# Patient Record
Sex: FEMALE | Race: ASIAN | NOT HISPANIC OR LATINO
[De-identification: names, ages, dates, MRNs, and addresses within clinical notes are randomized per-mention and may not be internally consistent; named-entity substitution may affect disease eponyms.]

---

## 2022-09-07 DIAGNOSIS — B35.1 TINEA UNGUIUM: ICD-10-CM

## 2022-09-07 DIAGNOSIS — Z82.49 FAMILY HISTORY OF ISCHEMIC HEART DISEASE AND OTHER DISEASES OF THE CIRCULATORY SYSTEM: ICD-10-CM

## 2022-09-07 DIAGNOSIS — M79.674 PAIN IN RIGHT TOE(S): ICD-10-CM

## 2022-09-07 DIAGNOSIS — I10 ESSENTIAL (PRIMARY) HYPERTENSION: ICD-10-CM

## 2022-09-07 DIAGNOSIS — M19.171 POST-TRAUMATIC OSTEOARTHRITIS, RIGHT ANKLE AND FOOT: ICD-10-CM

## 2022-09-07 DIAGNOSIS — M19.90 UNSPECIFIED OSTEOARTHRITIS, UNSPECIFIED SITE: ICD-10-CM

## 2022-09-07 DIAGNOSIS — Z88.9 ALLERGY STATUS TO UNSPECIFIED DRUGS, MEDICAMENTS AND BIOLOGICAL SUBSTANCES: ICD-10-CM

## 2022-09-07 DIAGNOSIS — Z87.448 PERSONAL HISTORY OF OTHER DISEASES OF URINARY SYSTEM: ICD-10-CM

## 2022-09-07 DIAGNOSIS — N28.9 DISORDER OF KIDNEY AND URETER, UNSPECIFIED: ICD-10-CM

## 2022-09-07 DIAGNOSIS — M79.675 PAIN IN LEFT TOE(S): ICD-10-CM

## 2022-09-07 DIAGNOSIS — E11.9 TYPE 2 DIABETES MELLITUS W/OUT COMPLICATIONS: ICD-10-CM

## 2022-09-07 PROBLEM — Z00.00 ENCOUNTER FOR PREVENTIVE HEALTH EXAMINATION: Status: ACTIVE | Noted: 2022-09-07

## 2022-09-07 RX ORDER — METOPROLOL SUCCINATE 100 MG/1
100 TABLET, EXTENDED RELEASE ORAL
Refills: 0 | Status: ACTIVE | COMMUNITY

## 2022-09-07 RX ORDER — GABAPENTIN 100 MG/1
100 CAPSULE ORAL
Refills: 0 | Status: ACTIVE | COMMUNITY

## 2022-09-07 RX ORDER — APIXABAN 2.5 MG/1
2.5 TABLET, FILM COATED ORAL
Refills: 0 | Status: ACTIVE | COMMUNITY

## 2022-09-07 RX ORDER — LEVOTHYROXINE SODIUM 0.05 MG/1
50 TABLET ORAL
Refills: 0 | Status: ACTIVE | COMMUNITY

## 2022-09-20 ENCOUNTER — APPOINTMENT (OUTPATIENT)
Dept: PODIATRY | Facility: CLINIC | Age: 82
End: 2022-09-20

## 2022-09-20 VITALS — WEIGHT: 100 LBS | BODY MASS INDEX: 19.63 KG/M2 | HEIGHT: 60 IN

## 2022-09-20 DIAGNOSIS — E11.42 TYPE 2 DIABETES MELLITUS WITH DIABETIC POLYNEUROPATHY: ICD-10-CM

## 2022-09-20 DIAGNOSIS — R26.2 DIFFICULTY IN WALKING, NOT ELSEWHERE CLASSIFIED: ICD-10-CM

## 2022-09-20 DIAGNOSIS — M21.171 VARUS DEFORMITY, NOT ELSEWHERE CLASSIFIED, RIGHT ANKLE: ICD-10-CM

## 2022-09-20 DIAGNOSIS — M25.571 PAIN IN RIGHT ANKLE AND JOINTS OF RIGHT FOOT: ICD-10-CM

## 2022-09-20 DIAGNOSIS — M19.071 PRIMARY OSTEOARTHRITIS, RIGHT ANKLE AND FOOT: ICD-10-CM

## 2022-09-20 DIAGNOSIS — N18.6 END STAGE RENAL DISEASE: ICD-10-CM

## 2022-09-20 DIAGNOSIS — Z99.2 END STAGE RENAL DISEASE: ICD-10-CM

## 2022-09-20 PROCEDURE — 99212 OFFICE O/P EST SF 10 MIN: CPT | Mod: 25

## 2022-09-20 PROCEDURE — 20605 DRAIN/INJ JOINT/BURSA W/O US: CPT | Mod: RT

## 2022-09-21 PROBLEM — M19.071 PRIMARY OSTEOARTHRITIS OF RIGHT ANKLE: Status: ACTIVE | Noted: 2022-09-07

## 2022-09-21 PROBLEM — M21.171: Status: ACTIVE | Noted: 2022-09-07

## 2022-09-23 PROBLEM — N18.6 END-STAGE RENAL DISEASE ON HEMODIALYSIS: Status: RESOLVED | Noted: 2022-09-21 | Resolved: 2022-09-23

## 2022-09-23 PROBLEM — M25.571 ANKLE PAIN, RIGHT: Status: ACTIVE | Noted: 2022-09-21

## 2022-09-23 PROBLEM — R26.2 DIFFICULTY WALKING: Status: ACTIVE | Noted: 2022-09-21

## 2022-09-23 NOTE — PHYSICAL EXAM
[Ankle Swelling (On Exam)] : present [Ankle Swelling Bilaterally] : bilaterally  [Ankle Swelling On The Right] : mild [Varicose Veins Of Lower Extremities] : bilaterally [1+] : left foot dorsalis pedis 1+ [Vibration Dec.] : diminished vibratory sensation at the level of the toes [Position Sense Dec.] : diminished position sense at the level of the toes [Diminished Throughout Right Foot] : diminished sensation with monofilament testing throughout right foot [Diminished Throughout Left Foot] : diminished sensation with monofilament testing throughout left foot [FreeTextEntry3] : CFT: Delayed.  Temperature gradient: warm to cool.   [de-identified] : Right ankle varus with decreased ROM of the ankle joint with crepitus and pain on palpation, medially and laterally.  Minimal swelling around the ankle.  Limit subtalar joint, right, ROM. [FreeTextEntry1] : Q8 - The patient has class findings of Q8 - Two Class B findings.

## 2022-09-23 NOTE — ASSESSMENT
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  Difficulty walking (R26.2).  Pain, right ankle (M79).  \par \par At this time, the patient prefers not to get a custom brace and would prefer to continue with injection therapy.  Discussed risks and benefits of injection therapy.  \par Location: Ankle, right.  \par Patient was placed in the treatment chair in supine position.  After obtaining verbal consent, time out was performed to the identified area of maximal tenderness.  The foot was prepped utilizing 70% alcohol solution.  A local injection consisting of 1cc of 0.5% Bupivacaine and 4mg of Dexamethasone and 2.5mg of Triamcinolone acetate was infiltrated at the site of maximum tenderness at the right ankle joint with immediate relief noted upon weight bearing. Injection site was covered with Band-Aid.  \par Patient was advised on the potential of a steroid flare following an injection, advised to apply ice to the area. Patient was instructed on the potential of a fascial rupture, advised to avoid high impact physical activities. \par Patient was referred to physical therapy for pain modality and ROM exercises and strengthening. \par Return: 2 months.  \par \par

## 2022-09-23 NOTE — HISTORY OF PRESENT ILLNESS
[Sneakers] : heather [Wheelchair] : a wheelchair [FreeTextEntry1] : Patient is here right a painful right ankle. 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  Patient has had multiple injections into the right ankle with steroid which has helped her intermittently.  Patient was unable to get an ankle boot as she finds it difficult to put on and she is mostly in a wheelchair except when at home, she tries to walk around the house.  \par Patient is currently not diabetic.  \par She has end-stage renal disease and is on hemodialysis.  \par